# Patient Record
Sex: MALE | Race: OTHER | HISPANIC OR LATINO | URBAN - METROPOLITAN AREA
[De-identification: names, ages, dates, MRNs, and addresses within clinical notes are randomized per-mention and may not be internally consistent; named-entity substitution may affect disease eponyms.]

---

## 2024-05-17 ENCOUNTER — EMERGENCY (EMERGENCY)
Facility: HOSPITAL | Age: 19
LOS: 1 days | Discharge: HOME | End: 2024-05-17
Attending: STUDENT IN AN ORGANIZED HEALTH CARE EDUCATION/TRAINING PROGRAM
Payer: COMMERCIAL

## 2024-05-17 VITALS
OXYGEN SATURATION: 100 % | RESPIRATION RATE: 18 BRPM | SYSTOLIC BLOOD PRESSURE: 112 MMHG | TEMPERATURE: 98 F | DIASTOLIC BLOOD PRESSURE: 62 MMHG | HEART RATE: 88 BPM | WEIGHT: 163.14 LBS | HEIGHT: 69 IN

## 2024-05-17 PROCEDURE — T1013: CPT

## 2024-05-17 PROCEDURE — 99282 EMERGENCY DEPT VISIT SF MDM: CPT

## 2024-05-17 PROCEDURE — 99283 EMERGENCY DEPT VISIT LOW MDM: CPT

## 2024-05-17 NOTE — ED PROVIDER NOTE - CARE PROVIDER_API CALL
Irwin Phillips  Ophthalmology  [address] [place] [address], [facility] [address]  [place] [address]  Phone: [phone]  Fax: [phone]  Follow Up Time:

## 2024-05-17 NOTE — ED PROVIDER NOTE - ATTENDING APP SHARED VISIT CONTRIBUTION OF CARE
18 yom with bump to left eye x 2 weeks. No fever, visual change, difficulty moving eye. No prior treatment. treat supportively for hordeolum, warm compress. outpt optho f/u

## 2024-05-17 NOTE — ED PROVIDER NOTE - RESPIRATORY, MLM
Patient called back. He wants to set up ablation and needs help setting up his portal. Secure chat message sent to Dr Hoffman and phone number for My Chart assistance given.  
Breath sounds clear and equal bilaterally.

## 2024-05-17 NOTE — ED PROVIDER NOTE - PATIENT PORTAL LINK FT
You can access the FollowMyHealth Patient Portal offered by [facility] by registering at the following website: [email or web address]. By joining [facility]’s FollowMyHealth portal, you will also be able to view your health information using other applications (apps) compatible with our system.

## 2024-05-17 NOTE — ED PROVIDER NOTE - CLINICAL SUMMARY MEDICAL DECISION MAKING FREE TEXT BOX
18M presenting to the ED c/o mildly painful left lower eyelid bump x 2 weeks. Pt otherwise denies fever/chills, c/p, sob, abd pain, n/v/c/d, dysuria, numbness/tingling/weakness and has no other complaints at this time. + hordeolum vs chalazion, recommend warm compresses and opthalmology f/u.

## 2024-05-17 NOTE — ED PROVIDER NOTE - EYES, MLM
Clear bilaterally, pupils equal, round and reactive to light. EOMs intact. + left lower lateral lid localized swelling/palpable lump, no periorbital erythema noted. No proptosis

## 2024-05-17 NOTE — ED PROVIDER NOTE - OBJECTIVE STATEMENT
18M presenting to the ED c/o mildly painful left lower eyelid bump x 2 weeks. Pt otherwise denies fever/chills, c/p, sob, abd pain, n/v/c/d, dysuria, numbness/tingling/weakness and has no other complaints at this time.

## 2024-06-04 ENCOUNTER — OFFICE (OUTPATIENT)
Dept: URBAN - METROPOLITAN AREA CLINIC 94 | Facility: CLINIC | Age: 19
Setting detail: OPHTHALMOLOGY
End: 2024-06-04
Payer: COMMERCIAL

## 2024-06-04 DIAGNOSIS — H01.001: ICD-10-CM

## 2024-06-04 DIAGNOSIS — H01.005: ICD-10-CM

## 2024-06-04 DIAGNOSIS — H00.035: ICD-10-CM

## 2024-06-04 DIAGNOSIS — H01.004: ICD-10-CM

## 2024-06-04 DIAGNOSIS — H01.002: ICD-10-CM

## 2024-06-04 PROCEDURE — 10061 I&D ABSCESS COMP/MULTIPLE: CPT | Mod: E2 | Performed by: OPHTHALMOLOGY

## 2024-06-04 PROCEDURE — 92285 EXTERNAL OCULAR PHOTOGRAPHY: CPT | Performed by: OPHTHALMOLOGY

## 2024-06-04 PROCEDURE — 99213 OFFICE O/P EST LOW 20 MIN: CPT | Mod: 25 | Performed by: OPHTHALMOLOGY

## 2024-06-04 ASSESSMENT — LID EXAM ASSESSMENTS
OD_BLEPHARITIS: RLL RUL 1+
OS_BLEPHARITIS: LLL LUL 1+

## 2024-06-14 ENCOUNTER — OFFICE (OUTPATIENT)
Dept: URBAN - METROPOLITAN AREA CLINIC 94 | Facility: CLINIC | Age: 19
Setting detail: OPHTHALMOLOGY
End: 2024-06-14
Payer: COMMERCIAL

## 2024-06-14 DIAGNOSIS — H01.002: ICD-10-CM

## 2024-06-14 DIAGNOSIS — H01.001: ICD-10-CM

## 2024-06-14 DIAGNOSIS — H00.15: ICD-10-CM

## 2024-06-14 DIAGNOSIS — H01.004: ICD-10-CM

## 2024-06-14 DIAGNOSIS — H01.005: ICD-10-CM

## 2024-06-14 DIAGNOSIS — H00.035: ICD-10-CM

## 2024-06-14 PROCEDURE — 99213 OFFICE O/P EST LOW 20 MIN: CPT | Performed by: OPHTHALMOLOGY

## 2024-06-14 ASSESSMENT — CONFRONTATIONAL VISUAL FIELD TEST (CVF)
OS_FINDINGS: FULL
OD_FINDINGS: FULL

## 2024-06-14 ASSESSMENT — LID EXAM ASSESSMENTS
OS_BLEPHARITIS: LLL LUL 1+
OD_BLEPHARITIS: RLL RUL 1+

## 2024-07-12 ENCOUNTER — OFFICE (OUTPATIENT)
Dept: URBAN - METROPOLITAN AREA CLINIC 94 | Facility: CLINIC | Age: 19
Setting detail: OPHTHALMOLOGY
End: 2024-07-12
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
## 2024-07-12 PROCEDURE — NO SHOW FE NO SHOW FEE: Performed by: OPHTHALMOLOGY
